# Patient Record
Sex: FEMALE | Race: WHITE | HISPANIC OR LATINO | URBAN - METROPOLITAN AREA
[De-identification: names, ages, dates, MRNs, and addresses within clinical notes are randomized per-mention and may not be internally consistent; named-entity substitution may affect disease eponyms.]

---

## 2020-06-19 ENCOUNTER — OFFICE VISIT (OUTPATIENT)
Dept: URBAN - METROPOLITAN AREA TELEHEALTH 2 | Facility: TELEHEALTH | Age: 75
End: 2020-06-19

## 2020-06-19 RX ORDER — INSULIN ASPART 100 [IU]/ML
INJECT BY SUBCUTANEOUS ROUTE PER PRESCRIBER'S INSTRUCTIONS. INSULIN DOSING REQUIRES INDIVIDUALIZATION INJECTION, SOLUTION INTRAVENOUS; SUBCUTANEOUS
Qty: 1 | Refills: 0 | COMMUNITY
Start: 1900-01-01

## 2020-06-19 RX ORDER — INSULIN GLARGINE 100 [IU]/ML
INJECTION, SOLUTION SUBCUTANEOUS
Qty: 0 | Refills: 0 | COMMUNITY
Start: 1900-01-01

## 2021-03-11 ENCOUNTER — OFFICE VISIT (OUTPATIENT)
Dept: URBAN - METROPOLITAN AREA CLINIC 98 | Facility: CLINIC | Age: 76
End: 2021-03-11
Payer: MEDICARE

## 2021-03-11 DIAGNOSIS — K21.9 GERD WITHOUT ESOPHAGITIS: ICD-10-CM

## 2021-03-11 DIAGNOSIS — R74.01 ELEVATED TRANSAMINASE LEVEL: ICD-10-CM

## 2021-03-11 DIAGNOSIS — E88.81 METABOLIC SYNDROME: ICD-10-CM

## 2021-03-11 DIAGNOSIS — Z12.11 COLON CANCER SCREENING: ICD-10-CM

## 2021-03-11 PROCEDURE — 99243 OFF/OP CNSLTJ NEW/EST LOW 30: CPT | Performed by: INTERNAL MEDICINE

## 2021-03-11 RX ORDER — INSULIN ASPART 100 [IU]/ML
INJECT BY SUBCUTANEOUS ROUTE PER PRESCRIBER'S INSTRUCTIONS. INSULIN DOSING REQUIRES INDIVIDUALIZATION INJECTION, SOLUTION INTRAVENOUS; SUBCUTANEOUS
Qty: 1 | Refills: 0 | COMMUNITY
Start: 1900-01-01

## 2021-03-11 RX ORDER — INSULIN GLARGINE 100 [IU]/ML
INJECTION, SOLUTION SUBCUTANEOUS
Qty: 0 | Refills: 0 | Status: DISCONTINUED | COMMUNITY
Start: 1900-01-01

## 2021-03-11 RX ORDER — SODIUM, POTASSIUM,MAG SULFATES 17.5-3.13G
354ML SOLUTION, RECONSTITUTED, ORAL ORAL
Qty: 345 MILLILITER | Refills: 0 | OUTPATIENT
Start: 2021-03-21 | End: 2021-03-22

## 2021-03-11 NOTE — HPI-TODAY'S VISIT:
Patient referred by Oscar Acuña MD. 74 yo pt referred for evaluation of rectal bleeding, w/ straining at stools, w/ passage of formed occasional loose stools and no melenic stools. Denies rectal pain. She reports, p-prandial abdominal cramping pain w/o diarrhea, worst w/ ingestion of salads and grains. She also admit to frequent regurgitation when bending over.She has had recent labs showing  elevated transaminases, no copy available.  Abdominal US: nodular liver and normal GB. Liver Bx 2015: bridging fibrosis and steatohepatitis no histopathologic features of AIH.  Colonoscopy 2/17: L-tics, polyps x 2, both TAs and I + E Hrrds. EGD 5/17: NERD, sm HH, CA Hp + gastritis, Rx'd and resolved.  She has had no response to Pantoprazole and denies dysphagia /odynophagia and nocturnal TISH sxs. No anorexia or weight loss. No other complaints. NO exposure to COVID-19 and has received 2 dose of COVID-19 vaccine. Normal mammograms. Current and updated MEDS: HCTZ, Humulin, Linzess, Loratidine, Losartan, Pantoprazole, Tresiba, Atorvastatin. PMHx: GERD - HH,Colon polyps, Hemorrhoids, DISLA, HTN, Obesity,T2DM, MetS, Functional constipation,              Diverticulosis, Knee OA, Hx H.pylori gastritis, HLD. PSHx: S/P ROBERT.

## 2021-03-18 ENCOUNTER — TELEPHONE ENCOUNTER (OUTPATIENT)
Dept: URBAN - METROPOLITAN AREA CLINIC 98 | Facility: CLINIC | Age: 76
End: 2021-03-18

## 2021-03-18 RX ORDER — ESOMEPRAZOLE MAGNESIUM 40 MG/1
1 CAPSULE CAPSULE, DELAYED RELEASE ORAL ONCE A DAY
Qty: 30 | Refills: 3 | OUTPATIENT
Start: 2021-03-25

## 2021-03-18 RX ORDER — SODIUM, POTASSIUM,MAG SULFATES 17.5-3.13G
354ML SOLUTION, RECONSTITUTED, ORAL ORAL
Qty: 345 MILLILITER | Refills: 0
Start: 2021-03-21

## 2021-04-01 LAB
ALPHA 2-MACROGLOBULINS, QN: 300
ALT (SGPT) P5P: 68
ANA DIRECT: POSITIVE
APOLIPOPROTEIN A-1: 136
AST (SGOT) P5P: 106
BILIRUBIN, TOTAL: 0.4
CHOLESTEROL, TOTAL: 173
COMMENT:: (no result)
COMMENT:: (no result)
FERRITIN, SERUM: 124
FIBROSIS SCORE: 0.79
FIBROSIS SCORING:: (no result)
FIBROSIS STAGE: (no result)
GGT: 176
GLUCOSE, SERUM: 347
HAPTOGLOBIN: 96
HBSAG SCREEN: NEGATIVE
HCV AB: <0.1
HEIGHT:: 66
HEP B CORE AB, IGM: NEGATIVE
HEP B CORE AB, TOT: NEGATIVE
HEP B SURFACE AB, QUAL: NON REACTIVE
HEP BE AB: NEGATIVE
HEP BE AG: NEGATIVE
IGG, IMMUNOGLOBULIN G (RDL): 1852
INTERPRETATIONS:: (no result)
IRON BIND.CAP.(TIBC): 346
IRON SATURATION: 21
IRON: 74
LIMITATIONS:: (no result)
MITOCHONDRIAL (M2) ANTIBODY: <20
NASH GRADE: (no result)
NASH SCORE: 0.75
NASH SCORING: (no result)
STEATOSIS GRADE: (no result)
STEATOSIS GRADING: (no result)
STEATOSIS SCORE: 0.94
TRIGLYCERIDES: 115
UIBC: 272
WEIGHT:: 250

## 2021-04-15 ENCOUNTER — OFFICE VISIT (OUTPATIENT)
Dept: URBAN - METROPOLITAN AREA SURGERY CENTER 18 | Facility: SURGERY CENTER | Age: 76
End: 2021-04-15
Payer: MEDICARE

## 2021-04-15 ENCOUNTER — CLAIMS CREATED FROM THE CLAIM WINDOW (OUTPATIENT)
Dept: URBAN - METROPOLITAN AREA CLINIC 4 | Facility: CLINIC | Age: 76
End: 2021-04-15
Payer: MEDICARE

## 2021-04-15 DIAGNOSIS — K29.60 ADENOPAPILLOMATOSIS GASTRICA: ICD-10-CM

## 2021-04-15 DIAGNOSIS — D12.3 BENIGN NEOPLASM OF TRANSVERSE COLON: ICD-10-CM

## 2021-04-15 DIAGNOSIS — K21.00 GASTRO-ESOPHAGEAL REFLUX DISEASE WITH ESOPHAGITIS, WITHOUT BLEEDING: ICD-10-CM

## 2021-04-15 DIAGNOSIS — K31.7 POLYP OF STOMACH AND DUODENUM: ICD-10-CM

## 2021-04-15 DIAGNOSIS — K63.89 STENOSIS OF ILEOCECAL VALVE: ICD-10-CM

## 2021-04-15 DIAGNOSIS — K63.89 BACTERIAL OVERGROWTH SYNDROME: ICD-10-CM

## 2021-04-15 DIAGNOSIS — K29.40 CHRONIC ATROPHIC GASTRITIS WITHOUT BLEEDING: ICD-10-CM

## 2021-04-15 DIAGNOSIS — Z12.11 COLON CANCER SCREENING: ICD-10-CM

## 2021-04-15 DIAGNOSIS — K31.89 ACQUIRED DEFORMITY OF DUODENUM: ICD-10-CM

## 2021-04-15 DIAGNOSIS — K21.9 ACID REFLUX: ICD-10-CM

## 2021-04-15 DIAGNOSIS — D12.4 BENIGN NEOPLASM OF DESCENDING COLON: ICD-10-CM

## 2021-04-15 DIAGNOSIS — D12.4 ADENOMA OF DESCENDING COLON: ICD-10-CM

## 2021-04-15 PROCEDURE — 88312 SPECIAL STAINS GROUP 1: CPT | Performed by: PATHOLOGY

## 2021-04-15 PROCEDURE — 45385 COLONOSCOPY W/LESION REMOVAL: CPT | Performed by: INTERNAL MEDICINE

## 2021-04-15 PROCEDURE — 88342 IMHCHEM/IMCYTCHM 1ST ANTB: CPT | Performed by: PATHOLOGY

## 2021-04-15 PROCEDURE — 43239 EGD BIOPSY SINGLE/MULTIPLE: CPT | Performed by: INTERNAL MEDICINE

## 2021-04-15 PROCEDURE — G8907 PT DOC NO EVENTS ON DISCHARG: HCPCS | Performed by: INTERNAL MEDICINE

## 2021-04-15 PROCEDURE — 88305 TISSUE EXAM BY PATHOLOGIST: CPT | Performed by: PATHOLOGY

## 2021-06-17 ENCOUNTER — DASHBOARD ENCOUNTERS (OUTPATIENT)
Age: 76
End: 2021-06-17

## 2021-06-18 ENCOUNTER — OFFICE VISIT (OUTPATIENT)
Dept: URBAN - METROPOLITAN AREA TELEHEALTH 2 | Facility: TELEHEALTH | Age: 76
End: 2021-06-18
Payer: MEDICARE

## 2021-06-18 DIAGNOSIS — R14.0 ABDOMINAL BLOATING: ICD-10-CM

## 2021-06-18 DIAGNOSIS — K21.9 GERD WITHOUT ESOPHAGITIS: ICD-10-CM

## 2021-06-18 DIAGNOSIS — R74.01 ELEVATED TRANSAMINASE LEVEL: ICD-10-CM

## 2021-06-18 DIAGNOSIS — E88.81 METABOLIC SYNDROME: ICD-10-CM

## 2021-06-18 PROCEDURE — 99214 OFFICE O/P EST MOD 30 MIN: CPT | Performed by: INTERNAL MEDICINE

## 2021-06-18 RX ORDER — SODIUM, POTASSIUM,MAG SULFATES 17.5-3.13G
354ML SOLUTION, RECONSTITUTED, ORAL ORAL
Qty: 345 MILLILITER | Refills: 0 | Status: ACTIVE | COMMUNITY
Start: 2021-03-21

## 2021-06-18 RX ORDER — INSULIN ASPART 100 [IU]/ML
INJECT BY SUBCUTANEOUS ROUTE PER PRESCRIBER'S INSTRUCTIONS. INSULIN DOSING REQUIRES INDIVIDUALIZATION INJECTION, SOLUTION INTRAVENOUS; SUBCUTANEOUS
Qty: 1 | Refills: 0 | COMMUNITY
Start: 1900-01-01

## 2021-06-18 RX ORDER — ESOMEPRAZOLE MAGNESIUM 40 MG/1
1 CAPSULE CAPSULE, DELAYED RELEASE ORAL ONCE A DAY
Qty: 30 | Refills: 3 | Status: ACTIVE | COMMUNITY
Start: 2021-03-25

## 2021-06-18 NOTE — HPI-TODAY'S VISIT:
This is a Telephone OV to which patient has agreed to. Limitations of telehealth discussed; she understands and agrees to proceed. 76 yo pt referred initially for evaluation of rectal bleeding, w/ straining at stools, w/ passage of formed occasional loose stools, no melenic stools and no rectal pain. Still w/ intermittent p-prandial abdominal cramping pain w/o diarrhea, worst w/ ingestion of salads and grains. She has persistent regurgitation, heartburn and epigastric pain after eating. Recent labs: normal Fe studies, ferritin, AMA, HBV - HCV serologies, F4 w/ severe steatosis, w/ ALT 68,  and . Abdominal US: nodular liver and normal GB. Liver Bx 2015: bridging fibrosis and steatohepatitis, no histopathologic features of AIH.  Colonoscopy 4/21: L-tics, polyps x 2, both TAs and I + E Hrrds. EGD 4/21: GERD, sm HH, chronic inactive  gastritis and normal duodenal bx's.  She has had someresponse to Pantoprazole and denies dysphagia /odynophagia or nocturnal TISH sxs. No anorexia or weight loss. No other complaints. NO exposure to COVID-19 and has received 2 dose of COVID-19 vaccine. Normal mammograms. Current and updated MEDS: HCTZ, Humulin, Linzess, Loratidine, Losartan, Pantoprazole, Tresiba, Atorvastatin. PMHx: GERD - HH,Colon polyps, Hemorrhoids, DISLA - cirrhosis, HTN, Obesity,T2DM, MetS, Functional constipation,Diverticulosis, Knee OA, Hx H.pylori gastritis, HLD. PSHx: S/P ROBERT. No other complaints. Admits being compliant w/ diet and Rx.

## 2021-07-03 PROBLEM — 237602007: Status: ACTIVE | Noted: 2021-03-11

## 2021-07-03 PROBLEM — 266435005: Status: ACTIVE | Noted: 2021-03-11

## 2021-07-03 PROBLEM — 162864005: Status: ACTIVE | Noted: 2021-03-11

## 2022-03-22 ENCOUNTER — ERX REFILL RESPONSE (OUTPATIENT)
Dept: URBAN - METROPOLITAN AREA CLINIC 98 | Facility: CLINIC | Age: 77
End: 2022-03-22

## 2022-03-22 RX ORDER — ESOMEPRAZOLE MAGNESIUM 40 MG/1
TAKE ONE CAPSULE BY MOUTH ONE TIME DAILY CAPSULE, DELAYED RELEASE ORAL
Qty: 30 CAPSULE | Refills: 4 | OUTPATIENT

## 2022-06-09 ENCOUNTER — TELEPHONE ENCOUNTER (OUTPATIENT)
Dept: URBAN - METROPOLITAN AREA CLINIC 98 | Facility: CLINIC | Age: 77
End: 2022-06-09

## 2022-06-09 RX ORDER — ESOMEPRAZOLE MAGNESIUM 40 MG/1
TAKE ONE CAPSULE BY MOUTH ONE TIME DAILY CAPSULE, DELAYED RELEASE ORAL ONCE A DAY
Qty: 90 CAPSULES | Refills: 3

## 2025-07-16 ENCOUNTER — TELEPHONE ENCOUNTER (OUTPATIENT)
Dept: URBAN - METROPOLITAN AREA CLINIC 98 | Facility: CLINIC | Age: 80
End: 2025-07-16

## 2025-07-16 ENCOUNTER — OFFICE VISIT (OUTPATIENT)
Dept: URBAN - METROPOLITAN AREA CLINIC 98 | Facility: CLINIC | Age: 80
End: 2025-07-16
Payer: COMMERCIAL

## 2025-07-16 ENCOUNTER — LAB OUTSIDE AN ENCOUNTER (OUTPATIENT)
Dept: URBAN - METROPOLITAN AREA CLINIC 98 | Facility: CLINIC | Age: 80
End: 2025-07-16

## 2025-07-16 DIAGNOSIS — K80.20 ASYMPTOMATIC GALLSTONES: ICD-10-CM

## 2025-07-16 DIAGNOSIS — K74.60 CIRRHOSIS OF LIVER WITHOUT ASCITES, UNSPECIFIED HEPATIC CIRRHOSIS TYPE: ICD-10-CM

## 2025-07-16 DIAGNOSIS — K21.9 CHRONIC GERD: ICD-10-CM

## 2025-07-16 DIAGNOSIS — Z12.11 COLON CANCER SCREENING: ICD-10-CM

## 2025-07-16 DIAGNOSIS — K57.30 DIVERTICULOSIS OF COLON WITHOUT DIVERTICULITIS: ICD-10-CM

## 2025-07-16 PROBLEM — 235595009: Status: ACTIVE | Noted: 2025-07-16

## 2025-07-16 PROBLEM — 722866000: Status: ACTIVE | Noted: 2025-07-16

## 2025-07-16 PROCEDURE — 99244 OFF/OP CNSLTJ NEW/EST MOD 40: CPT | Performed by: INTERNAL MEDICINE

## 2025-07-16 PROCEDURE — 99204 OFFICE O/P NEW MOD 45 MIN: CPT | Performed by: INTERNAL MEDICINE

## 2025-07-16 RX ORDER — ESOMEPRAZOLE MAGNESIUM 40 MG/1
1 CAPSULE CAPSULE, DELAYED RELEASE ORAL ONCE A DAY
Qty: 30 | Refills: 3 | Status: ACTIVE | COMMUNITY
Start: 2021-03-25

## 2025-07-16 RX ORDER — INSULIN ASPART 100 [IU]/ML
INJECT BY SUBCUTANEOUS ROUTE PER PRESCRIBER'S INSTRUCTIONS. INSULIN DOSING REQUIRES INDIVIDUALIZATION INJECTION, SOLUTION INTRAVENOUS; SUBCUTANEOUS
Qty: 1 | Refills: 0 | COMMUNITY
Start: 1900-01-01

## 2025-07-16 RX ORDER — SODIUM, POTASSIUM,MAG SULFATES 17.5-3.13G
354ML SOLUTION, RECONSTITUTED, ORAL ORAL
Qty: 345 MILLILITER | Refills: 0 | OUTPATIENT
Start: 2025-07-16 | End: 2025-07-17

## 2025-07-16 RX ORDER — ONDANSETRON 4 MG/1
1 TABLET ON THE TONGUE AND ALLOW TO DISSOLVE TABLET, ORALLY DISINTEGRATING ORAL ONCE A DAY
Status: ACTIVE | COMMUNITY
Start: 2025-07-21

## 2025-07-16 RX ORDER — BRIMONIDINE TARTRATE, TIMOLOL MALEATE 2; 5 MG/ML; MG/ML
1 DROP INTO AFFECTED EYE SOLUTION/ DROPS OPHTHALMIC TWICE A DAY
Status: ACTIVE | COMMUNITY
Start: 2025-07-21

## 2025-07-16 RX ORDER — ESOMEPRAZOLE MAGNESIUM 40 MG/1
TAKE ONE CAPSULE BY MOUTH ONE TIME DAILY CAPSULE, DELAYED RELEASE ORAL ONCE A DAY
Qty: 90 CAPSULES | Refills: 3 | Status: ACTIVE | COMMUNITY

## 2025-07-16 RX ORDER — LINACLOTIDE 145 UG/1
1 CAPSULE AT LEAST 30 MINUTES BEFORE THE FIRST MEAL OF THE DAY ON AN EMPTY STOMACH CAPSULE, GELATIN COATED ORAL ONCE A DAY
Status: ACTIVE | COMMUNITY
Start: 2025-07-21

## 2025-07-16 RX ORDER — ROSUVASTATIN CALCIUM 20 MG/1
1 TABLET TABLET, COATED ORAL ONCE A DAY
Status: ACTIVE | COMMUNITY
Start: 2025-07-21

## 2025-07-16 NOTE — HPI-TODAY'S VISIT:
Patient referred by Alfred Prado MD for CRC screening.  Copy of this consult OV sent to Dr. Prado. 78 yo-pt who's due for surveillance colonoscopy. Has no complaints of abdominal pain, change in bowel pattern, constipation, diarrhea, melenic stools, hematochezia, anorexia, weight loss or fatigue. She was having lower abdominal pain, seen by her PCP: A + P CT scan at [facility]: bilateral lower lung nodules, subsegmental atelectasis, nodular liver w no parenchymal liver lesions, no radiologic evidence of PHTN, L-diverticulosis w no diverticulitis, atherosclerosis and gallstones w no obstruction nor inflammation. She reports constipation w straining at stools, having bm's qd, w high fiber diet and no melenic stools nor hematochezia. Also, reports chronic nausea w occasional bilious emesis wo no melanemesis nor hematemesis. At times, she vomits partial digested food.She feels fatigued. Has been on Nexium 40 mg po bid and was on Mounjaro, which she stopped 6 mos ago because of abdominal pain. Has gained ~ 10 lbs since she stopped Mounjaro.  Denies constitutional nor cardiorespiratory sxs. No FHx CC / pp / IBD / GI malignancies nor hematologic disorders. CPE normal recently, no copy of labs available. No cardiorespiratory sxs. No other complaints.

## 2025-07-18 ENCOUNTER — TELEPHONE ENCOUNTER (OUTPATIENT)
Dept: URBAN - METROPOLITAN AREA CLINIC 98 | Facility: CLINIC | Age: 80
End: 2025-07-18

## 2025-07-21 ENCOUNTER — LAB OUTSIDE AN ENCOUNTER (OUTPATIENT)
Dept: URBAN - METROPOLITAN AREA CLINIC 98 | Facility: CLINIC | Age: 80
End: 2025-07-21

## 2025-07-21 PROBLEM — 398311004: Status: ACTIVE | Noted: 2025-07-21

## 2025-07-21 PROBLEM — 19943007: Status: ACTIVE | Noted: 2025-07-16

## 2025-07-21 PROBLEM — 266474003: Status: ACTIVE | Noted: 2025-07-21

## 2025-07-21 LAB
AFP, SERUM, TUMOR MARKER: 7.3
ALBUMIN: 3.8
ALKALINE PHOSPHATASE: 197
ALT (SGPT): 22
AMMONIA, PLASMA: 117
AST (SGOT): 42
BILIRUBIN, TOTAL: 0.4
BUN/CREATININE RATIO: 19
BUN: 23
CALCIUM: 9.7
CARBON DIOXIDE, TOTAL: 16
CHLORIDE: 102
CREATININE: 1.18
EGFR: 47
GLOBULIN, TOTAL: 3.5
GLUCOSE: 444
HEMATOCRIT: 40.3
HEMOGLOBIN: 12.9
INR: 1
MCH: 30.4
MCHC: 32
MCV: 95
NRBC: (no result)
PLATELETS: 209
POTASSIUM: 5.2
PROTEIN, TOTAL: 7.3
PROTHROMBIN TIME: 10.8
RBC: 4.25
RDW: 11.5
SODIUM: 137
WBC: 7.7

## 2025-08-01 ENCOUNTER — TELEPHONE ENCOUNTER (OUTPATIENT)
Dept: URBAN - METROPOLITAN AREA CLINIC 98 | Facility: CLINIC | Age: 80
End: 2025-08-01

## 2025-08-04 ENCOUNTER — OFFICE VISIT (OUTPATIENT)
Dept: URBAN - METROPOLITAN AREA MEDICAL CENTER 39 | Facility: MEDICAL CENTER | Age: 80
End: 2025-08-04
Payer: COMMERCIAL

## 2025-08-04 DIAGNOSIS — Z12.11 COLON CANCER SCREENING: ICD-10-CM

## 2025-08-04 PROCEDURE — G0121 COLON CA SCRN NOT HI RSK IND: HCPCS | Performed by: INTERNAL MEDICINE

## 2025-08-04 RX ORDER — ESOMEPRAZOLE MAGNESIUM 40 MG/1
TAKE ONE CAPSULE BY MOUTH ONE TIME DAILY CAPSULE, DELAYED RELEASE ORAL ONCE A DAY
Qty: 90 CAPSULES | Refills: 3 | Status: ACTIVE | COMMUNITY

## 2025-08-04 RX ORDER — ROSUVASTATIN CALCIUM 20 MG/1
1 TABLET TABLET, COATED ORAL ONCE A DAY
Status: ACTIVE | COMMUNITY
Start: 2025-07-21

## 2025-08-04 RX ORDER — BRIMONIDINE TARTRATE, TIMOLOL MALEATE 2; 5 MG/ML; MG/ML
1 DROP INTO AFFECTED EYE SOLUTION/ DROPS OPHTHALMIC TWICE A DAY
Status: ACTIVE | COMMUNITY
Start: 2025-07-21

## 2025-08-04 RX ORDER — ESOMEPRAZOLE MAGNESIUM 40 MG/1
1 CAPSULE CAPSULE, DELAYED RELEASE ORAL ONCE A DAY
Qty: 30 | Refills: 3 | Status: ACTIVE | COMMUNITY
Start: 2021-03-25

## 2025-08-04 RX ORDER — ONDANSETRON 4 MG/1
1 TABLET ON THE TONGUE AND ALLOW TO DISSOLVE TABLET, ORALLY DISINTEGRATING ORAL ONCE A DAY
Status: ACTIVE | COMMUNITY
Start: 2025-07-21

## 2025-08-04 RX ORDER — INSULIN ASPART 100 [IU]/ML
INJECT BY SUBCUTANEOUS ROUTE PER PRESCRIBER'S INSTRUCTIONS. INSULIN DOSING REQUIRES INDIVIDUALIZATION INJECTION, SOLUTION INTRAVENOUS; SUBCUTANEOUS
Qty: 1 | Refills: 0 | COMMUNITY
Start: 1900-01-01

## 2025-08-04 RX ORDER — LINACLOTIDE 145 UG/1
1 CAPSULE AT LEAST 30 MINUTES BEFORE THE FIRST MEAL OF THE DAY ON AN EMPTY STOMACH CAPSULE, GELATIN COATED ORAL ONCE A DAY
Status: ACTIVE | COMMUNITY
Start: 2025-07-21

## 2025-08-27 ENCOUNTER — OFFICE VISIT (OUTPATIENT)
Dept: URBAN - METROPOLITAN AREA CLINIC 111 | Facility: CLINIC | Age: 80
End: 2025-08-27
Payer: COMMERCIAL

## 2025-08-27 DIAGNOSIS — K59.09 CHRONIC CONSTIPATION: ICD-10-CM

## 2025-08-27 DIAGNOSIS — K74.60 CIRRHOSIS: ICD-10-CM

## 2025-08-27 DIAGNOSIS — K76.0 METABOLIC DYSFUNCTION-ASSOCIATED FATTY LIVER DISEASE (MAFLD): ICD-10-CM

## 2025-08-27 PROCEDURE — 99214 OFFICE O/P EST MOD 30 MIN: CPT | Performed by: PHYSICIAN ASSISTANT

## 2025-08-27 RX ORDER — ROSUVASTATIN CALCIUM 20 MG/1
1 TABLET TABLET, COATED ORAL ONCE A DAY
Status: ACTIVE | COMMUNITY
Start: 2025-07-21

## 2025-08-27 RX ORDER — ESOMEPRAZOLE MAGNESIUM 40 MG/1
1 CAPSULE CAPSULE, DELAYED RELEASE ORAL ONCE A DAY
Qty: 30 | Refills: 3 | Status: ACTIVE | COMMUNITY
Start: 2021-03-25

## 2025-08-27 RX ORDER — INSULIN ASPART 100 [IU]/ML
INJECT BY SUBCUTANEOUS ROUTE PER PRESCRIBER'S INSTRUCTIONS. INSULIN DOSING REQUIRES INDIVIDUALIZATION INJECTION, SOLUTION INTRAVENOUS; SUBCUTANEOUS
Qty: 1 | Refills: 0 | COMMUNITY
Start: 1900-01-01

## 2025-08-27 RX ORDER — ESOMEPRAZOLE MAGNESIUM 40 MG/1
TAKE ONE CAPSULE BY MOUTH ONE TIME DAILY CAPSULE, DELAYED RELEASE ORAL ONCE A DAY
Qty: 90 CAPSULES | Refills: 3 | Status: ACTIVE | COMMUNITY

## 2025-08-27 RX ORDER — ONDANSETRON 4 MG/1
1 TABLET ON THE TONGUE AND ALLOW TO DISSOLVE TABLET, ORALLY DISINTEGRATING ORAL ONCE A DAY
Status: ACTIVE | COMMUNITY
Start: 2025-07-21

## 2025-08-27 RX ORDER — BRIMONIDINE TARTRATE, TIMOLOL MALEATE 2; 5 MG/ML; MG/ML
1 DROP INTO AFFECTED EYE SOLUTION/ DROPS OPHTHALMIC TWICE A DAY
Status: ACTIVE | COMMUNITY
Start: 2025-07-21

## 2025-08-27 RX ORDER — LINACLOTIDE 145 UG/1
1 CAPSULE AT LEAST 30 MINUTES BEFORE THE FIRST MEAL OF THE DAY ON AN EMPTY STOMACH CAPSULE, GELATIN COATED ORAL ONCE A DAY
Status: ACTIVE | COMMUNITY
Start: 2025-07-21